# Patient Record
Sex: MALE | Race: OTHER | HISPANIC OR LATINO | URBAN - METROPOLITAN AREA
[De-identification: names, ages, dates, MRNs, and addresses within clinical notes are randomized per-mention and may not be internally consistent; named-entity substitution may affect disease eponyms.]

---

## 2018-12-06 ENCOUNTER — EMERGENCY (EMERGENCY)
Facility: HOSPITAL | Age: 23
LOS: 0 days | Discharge: HOME | End: 2018-12-06
Attending: EMERGENCY MEDICINE
Payer: MEDICAID

## 2018-12-06 VITALS
HEART RATE: 49 BPM | RESPIRATION RATE: 18 BRPM | DIASTOLIC BLOOD PRESSURE: 44 MMHG | TEMPERATURE: 98 F | OXYGEN SATURATION: 100 % | SYSTOLIC BLOOD PRESSURE: 125 MMHG

## 2018-12-06 VITALS
HEART RATE: 50 BPM | DIASTOLIC BLOOD PRESSURE: 55 MMHG | SYSTOLIC BLOOD PRESSURE: 104 MMHG | TEMPERATURE: 99 F | RESPIRATION RATE: 18 BRPM | OXYGEN SATURATION: 100 %

## 2018-12-06 DIAGNOSIS — F12.10 CANNABIS ABUSE, UNCOMPLICATED: ICD-10-CM

## 2018-12-06 DIAGNOSIS — R07.89 OTHER CHEST PAIN: ICD-10-CM

## 2018-12-06 LAB
ALBUMIN SERPL ELPH-MCNC: 4 G/DL — SIGNIFICANT CHANGE UP (ref 3.3–5)
ALP SERPL-CCNC: 42 U/L — SIGNIFICANT CHANGE UP (ref 40–120)
ALT FLD-CCNC: 28 U/L — SIGNIFICANT CHANGE UP (ref 12–78)
ANION GAP SERPL CALC-SCNC: 8 MMOL/L — SIGNIFICANT CHANGE UP (ref 5–17)
APTT BLD: 28.4 SEC — SIGNIFICANT CHANGE UP (ref 27.5–36.3)
AST SERPL-CCNC: 20 U/L — SIGNIFICANT CHANGE UP (ref 15–37)
BASOPHILS # BLD AUTO: 0.06 K/UL — SIGNIFICANT CHANGE UP (ref 0–0.2)
BASOPHILS NFR BLD AUTO: 0.8 % — SIGNIFICANT CHANGE UP (ref 0–2)
BILIRUB SERPL-MCNC: 0.5 MG/DL — SIGNIFICANT CHANGE UP (ref 0.2–1.2)
BUN SERPL-MCNC: 11 MG/DL — SIGNIFICANT CHANGE UP (ref 7–23)
CALCIUM SERPL-MCNC: 8.5 MG/DL — SIGNIFICANT CHANGE UP (ref 8.5–10.1)
CHLORIDE SERPL-SCNC: 106 MMOL/L — SIGNIFICANT CHANGE UP (ref 96–108)
CO2 SERPL-SCNC: 25 MMOL/L — SIGNIFICANT CHANGE UP (ref 22–31)
CREAT SERPL-MCNC: 0.83 MG/DL — SIGNIFICANT CHANGE UP (ref 0.5–1.3)
D DIMER BLD IA.RAPID-MCNC: <150 NG/ML DDU — SIGNIFICANT CHANGE UP
EOSINOPHIL # BLD AUTO: 0.1 K/UL — SIGNIFICANT CHANGE UP (ref 0–0.5)
EOSINOPHIL NFR BLD AUTO: 1.4 % — SIGNIFICANT CHANGE UP (ref 0–6)
GLUCOSE SERPL-MCNC: 95 MG/DL — SIGNIFICANT CHANGE UP (ref 70–99)
HCT VFR BLD CALC: 41.4 % — SIGNIFICANT CHANGE UP (ref 39–50)
HGB BLD-MCNC: 13.9 G/DL — SIGNIFICANT CHANGE UP (ref 13–17)
IMM GRANULOCYTES NFR BLD AUTO: 0.1 % — SIGNIFICANT CHANGE UP (ref 0–1.5)
INR BLD: 1.06 RATIO — SIGNIFICANT CHANGE UP (ref 0.88–1.16)
LYMPHOCYTES # BLD AUTO: 1.45 K/UL — SIGNIFICANT CHANGE UP (ref 1–3.3)
LYMPHOCYTES # BLD AUTO: 19.9 % — SIGNIFICANT CHANGE UP (ref 13–44)
MCHC RBC-ENTMCNC: 28.8 PG — SIGNIFICANT CHANGE UP (ref 27–34)
MCHC RBC-ENTMCNC: 33.6 GM/DL — SIGNIFICANT CHANGE UP (ref 32–36)
MCV RBC AUTO: 85.9 FL — SIGNIFICANT CHANGE UP (ref 80–100)
MONOCYTES # BLD AUTO: 0.34 K/UL — SIGNIFICANT CHANGE UP (ref 0–0.9)
MONOCYTES NFR BLD AUTO: 4.7 % — SIGNIFICANT CHANGE UP (ref 2–14)
NEUTROPHILS # BLD AUTO: 5.31 K/UL — SIGNIFICANT CHANGE UP (ref 1.8–7.4)
NEUTROPHILS NFR BLD AUTO: 73.1 % — SIGNIFICANT CHANGE UP (ref 43–77)
PLATELET # BLD AUTO: 223 K/UL — SIGNIFICANT CHANGE UP (ref 150–400)
POTASSIUM SERPL-MCNC: 3.9 MMOL/L — SIGNIFICANT CHANGE UP (ref 3.5–5.3)
POTASSIUM SERPL-SCNC: 3.9 MMOL/L — SIGNIFICANT CHANGE UP (ref 3.5–5.3)
PROT SERPL-MCNC: 7.3 GM/DL — SIGNIFICANT CHANGE UP (ref 6–8.3)
PROTHROM AB SERPL-ACNC: 11.8 SEC — SIGNIFICANT CHANGE UP (ref 10–12.9)
RBC # BLD: 4.82 M/UL — SIGNIFICANT CHANGE UP (ref 4.2–5.8)
RBC # FLD: 12.4 % — SIGNIFICANT CHANGE UP (ref 10.3–14.5)
SODIUM SERPL-SCNC: 139 MMOL/L — SIGNIFICANT CHANGE UP (ref 135–145)
TROPONIN I SERPL-MCNC: <0.015 NG/ML — SIGNIFICANT CHANGE UP (ref 0.01–0.04)
TROPONIN I SERPL-MCNC: <0.015 NG/ML — SIGNIFICANT CHANGE UP (ref 0.01–0.04)
WBC # BLD: 7.27 K/UL — SIGNIFICANT CHANGE UP (ref 3.8–10.5)
WBC # FLD AUTO: 7.27 K/UL — SIGNIFICANT CHANGE UP (ref 3.8–10.5)

## 2018-12-06 PROCEDURE — 99284 EMERGENCY DEPT VISIT MOD MDM: CPT

## 2018-12-06 PROCEDURE — 71045 X-RAY EXAM CHEST 1 VIEW: CPT | Mod: 26

## 2018-12-06 PROCEDURE — 93010 ELECTROCARDIOGRAM REPORT: CPT

## 2018-12-06 RX ORDER — IBUPROFEN 200 MG
400 TABLET ORAL ONCE
Qty: 0 | Refills: 0 | Status: COMPLETED | OUTPATIENT
Start: 2018-12-06 | End: 2018-12-06

## 2018-12-06 RX ADMIN — Medication 400 MILLIGRAM(S): at 19:16

## 2018-12-06 NOTE — ED STATDOCS - PROGRESS NOTE DETAILS
KAYLIN Car:   Patient has been seen, evaluated and orders have been written by the attending in intake. Patient is stable.  I will follow up the results of orders written and I will continue to evaluate/observe the patient.  CP x 2 days.  Pt. with family history of HTN.  Susana Car PA-C Reevaluated patient at bedside.  Patient feeling much improved.  Discussed the results of all diagnostic testing in ED and copies of all reports given.   An opportunity to ask questions was given.  Discussed the importance of prompt, close medical follow-up.  Patient will return with any changes, concerns or persistent / worsening symptoms.  Understanding of all instructions verbalized.  Susana Car PA-C  #[id number] used for .  To FU with [facility].  Strict return precautions provided.

## 2018-12-06 NOTE — ED STATDOCS - ATTENDING CONTRIBUTION TO CARE
I, Radha Lee MD, personally saw the patient with ACP.  I have personally performed a face to face diagnostic evaluation on this patient.  I have reviewed the ACP note and agree with the history, exam, and plan of care, except as noted.

## 2018-12-06 NOTE — ED STATDOCS - OBJECTIVE STATEMENT
22 y/o male with no relevant PMHx presents to the ED c/o left-sided, intermittent CP and SOB x2 days, worsened with breathing deeply, described as pressure, non-radiating. Pt is unsure what brings on the pain, it comes on at random. Pain is not worsened with palpation or left arm movement. Denies recent travel. +marijuana use last night. Pt notes he is a . Denies recent exercise or weight lifting other than his physically-demanding job. +FHx of cardiac issue in pt's father and sister, pt is unsure what issue is. Denies sudden cardiac death in family. Denies cocaine use.  #[id number]

## 2018-12-06 NOTE — ED ADULT NURSE NOTE - CHIEF COMPLAINT QUOTE
Pt presents to ED from [facility] c/o CP starting yesterday. Pt reports SOB. Pt denies recent travel. Pt taken directly to intake room for EKG and VS

## 2018-12-06 NOTE — ED ADULT TRIAGE NOTE - CHIEF COMPLAINT QUOTE
Pt presents to ED from [facility] c/o CP starting yesterday. Pt reports SOB. Pt denies recent travel. Pt taken directly to intake room for EKG and VS

## 2018-12-06 NOTE — ED STATDOCS - MEDICAL DECISION MAKING DETAILS
24 y/o male with chest pressure x2 days, intermittent. Will obtain d-dimer, troponin, cardiac monitoring, CXR, and reassess.

## 2020-01-09 ENCOUNTER — EMERGENCY (EMERGENCY)
Facility: HOSPITAL | Age: 25
LOS: 0 days | Discharge: HOME | End: 2020-01-09
Attending: EMERGENCY MEDICINE
Payer: MEDICAID

## 2020-01-09 VITALS
SYSTOLIC BLOOD PRESSURE: 118 MMHG | DIASTOLIC BLOOD PRESSURE: 58 MMHG | OXYGEN SATURATION: 100 % | HEART RATE: 55 BPM | RESPIRATION RATE: 19 BRPM | TEMPERATURE: 98 F

## 2020-01-09 VITALS — WEIGHT: 199.96 LBS | HEIGHT: 66.93 IN

## 2020-01-09 DIAGNOSIS — R10.32 LEFT LOWER QUADRANT PAIN: ICD-10-CM

## 2020-01-09 DIAGNOSIS — R19.7 DIARRHEA, UNSPECIFIED: ICD-10-CM

## 2020-01-09 DIAGNOSIS — K57.92 DIVERTICULITIS OF INTESTINE, PART UNSPECIFIED, WITHOUT PERFORATION OR ABSCESS WITHOUT BLEEDING: ICD-10-CM

## 2020-01-09 DIAGNOSIS — M54.5 LOW BACK PAIN: ICD-10-CM

## 2020-01-09 LAB
ALBUMIN SERPL ELPH-MCNC: 4 G/DL — SIGNIFICANT CHANGE UP (ref 3.3–5)
ALP SERPL-CCNC: 45 U/L — SIGNIFICANT CHANGE UP (ref 40–120)
ALT FLD-CCNC: 24 U/L — SIGNIFICANT CHANGE UP (ref 12–78)
ANION GAP SERPL CALC-SCNC: 4 MMOL/L — LOW (ref 5–17)
AST SERPL-CCNC: 13 U/L — LOW (ref 15–37)
BASOPHILS # BLD AUTO: 0.05 K/UL — SIGNIFICANT CHANGE UP (ref 0–0.2)
BASOPHILS NFR BLD AUTO: 0.8 % — SIGNIFICANT CHANGE UP (ref 0–2)
BILIRUB SERPL-MCNC: 0.4 MG/DL — SIGNIFICANT CHANGE UP (ref 0.2–1.2)
BUN SERPL-MCNC: 12 MG/DL — SIGNIFICANT CHANGE UP (ref 7–23)
CALCIUM SERPL-MCNC: 8.7 MG/DL — SIGNIFICANT CHANGE UP (ref 8.5–10.1)
CHLORIDE SERPL-SCNC: 106 MMOL/L — SIGNIFICANT CHANGE UP (ref 96–108)
CO2 SERPL-SCNC: 28 MMOL/L — SIGNIFICANT CHANGE UP (ref 22–31)
CREAT SERPL-MCNC: 0.96 MG/DL — SIGNIFICANT CHANGE UP (ref 0.5–1.3)
EOSINOPHIL # BLD AUTO: 0.19 K/UL — SIGNIFICANT CHANGE UP (ref 0–0.5)
EOSINOPHIL NFR BLD AUTO: 3.1 % — SIGNIFICANT CHANGE UP (ref 0–6)
GLUCOSE SERPL-MCNC: 103 MG/DL — HIGH (ref 70–99)
HCT VFR BLD CALC: 43.7 % — SIGNIFICANT CHANGE UP (ref 39–50)
HGB BLD-MCNC: 14.4 G/DL — SIGNIFICANT CHANGE UP (ref 13–17)
IMM GRANULOCYTES NFR BLD AUTO: 0.3 % — SIGNIFICANT CHANGE UP (ref 0–1.5)
LIDOCAIN IGE QN: 100 U/L — SIGNIFICANT CHANGE UP (ref 73–393)
LYMPHOCYTES # BLD AUTO: 1.7 K/UL — SIGNIFICANT CHANGE UP (ref 1–3.3)
LYMPHOCYTES # BLD AUTO: 27.3 % — SIGNIFICANT CHANGE UP (ref 13–44)
MCHC RBC-ENTMCNC: 27.8 PG — SIGNIFICANT CHANGE UP (ref 27–34)
MCHC RBC-ENTMCNC: 33 GM/DL — SIGNIFICANT CHANGE UP (ref 32–36)
MCV RBC AUTO: 84.4 FL — SIGNIFICANT CHANGE UP (ref 80–100)
MONOCYTES # BLD AUTO: 0.38 K/UL — SIGNIFICANT CHANGE UP (ref 0–0.9)
MONOCYTES NFR BLD AUTO: 6.1 % — SIGNIFICANT CHANGE UP (ref 2–14)
NEUTROPHILS # BLD AUTO: 3.88 K/UL — SIGNIFICANT CHANGE UP (ref 1.8–7.4)
NEUTROPHILS NFR BLD AUTO: 62.4 % — SIGNIFICANT CHANGE UP (ref 43–77)
PLATELET # BLD AUTO: 214 K/UL — SIGNIFICANT CHANGE UP (ref 150–400)
POTASSIUM SERPL-MCNC: 3.7 MMOL/L — SIGNIFICANT CHANGE UP (ref 3.5–5.3)
POTASSIUM SERPL-SCNC: 3.7 MMOL/L — SIGNIFICANT CHANGE UP (ref 3.5–5.3)
PROT SERPL-MCNC: 7.4 GM/DL — SIGNIFICANT CHANGE UP (ref 6–8.3)
RBC # BLD: 5.18 M/UL — SIGNIFICANT CHANGE UP (ref 4.2–5.8)
RBC # FLD: 12.2 % — SIGNIFICANT CHANGE UP (ref 10.3–14.5)
SODIUM SERPL-SCNC: 138 MMOL/L — SIGNIFICANT CHANGE UP (ref 135–145)
WBC # BLD: 6.22 K/UL — SIGNIFICANT CHANGE UP (ref 3.8–10.5)
WBC # FLD AUTO: 6.22 K/UL — SIGNIFICANT CHANGE UP (ref 3.8–10.5)

## 2020-01-09 PROCEDURE — 99284 EMERGENCY DEPT VISIT MOD MDM: CPT | Mod: 25

## 2020-01-09 PROCEDURE — 99284 EMERGENCY DEPT VISIT MOD MDM: CPT

## 2020-01-09 PROCEDURE — 36415 COLL VENOUS BLD VENIPUNCTURE: CPT

## 2020-01-09 PROCEDURE — 85025 COMPLETE CBC W/AUTO DIFF WBC: CPT

## 2020-01-09 PROCEDURE — 96374 THER/PROPH/DIAG INJ IV PUSH: CPT | Mod: XU

## 2020-01-09 PROCEDURE — 74177 CT ABD & PELVIS W/CONTRAST: CPT | Mod: 26

## 2020-01-09 PROCEDURE — 83690 ASSAY OF LIPASE: CPT

## 2020-01-09 PROCEDURE — 74177 CT ABD & PELVIS W/CONTRAST: CPT

## 2020-01-09 PROCEDURE — 80053 COMPREHEN METABOLIC PANEL: CPT

## 2020-01-09 RX ORDER — PANTOPRAZOLE SODIUM 20 MG/1
1 TABLET, DELAYED RELEASE ORAL
Qty: 14 | Refills: 0
Start: 2020-01-09

## 2020-01-09 RX ORDER — KETOROLAC TROMETHAMINE 30 MG/ML
30 SYRINGE (ML) INJECTION ONCE
Refills: 0 | Status: DISCONTINUED | OUTPATIENT
Start: 2020-01-09 | End: 2020-01-09

## 2020-01-09 RX ORDER — CIPROFLOXACIN LACTATE 400MG/40ML
500 VIAL (ML) INTRAVENOUS ONCE
Refills: 0 | Status: COMPLETED | OUTPATIENT
Start: 2020-01-09 | End: 2020-01-09

## 2020-01-09 RX ORDER — METRONIDAZOLE 500 MG
1 TABLET ORAL
Qty: 30 | Refills: 0
Start: 2020-01-09

## 2020-01-09 RX ORDER — SODIUM CHLORIDE 9 MG/ML
1000 INJECTION INTRAMUSCULAR; INTRAVENOUS; SUBCUTANEOUS ONCE
Refills: 0 | Status: DISCONTINUED | OUTPATIENT
Start: 2020-01-09 | End: 2020-01-09

## 2020-01-09 RX ORDER — MOXIFLOXACIN HYDROCHLORIDE TABLETS, 400 MG 400 MG/1
1 TABLET, FILM COATED ORAL
Qty: 20 | Refills: 0
Start: 2020-01-09

## 2020-01-09 RX ORDER — METRONIDAZOLE 500 MG
500 TABLET ORAL ONCE
Refills: 0 | Status: COMPLETED | OUTPATIENT
Start: 2020-01-09 | End: 2020-01-09

## 2020-01-09 RX ADMIN — Medication 30 MILLIGRAM(S): at 20:40

## 2020-01-09 RX ADMIN — Medication 500 MILLIGRAM(S): at 20:40

## 2020-01-09 NOTE — ED STATDOCS - PROGRESS NOTE DETAILS
Patient is a 23 y/o male with no significant PMHx who presents to [facility] c/o LLQ abd pain x 3 days. +Diarrhea DENIES nausea/vomiting/rectal bleeding, fever of chills. ~KAYLIN Gallardo Patient re-examined and re-evaluated. Patient feels much better at this time. ED evaluation, Diagnosis and management discussed with the patient and family in detail. Workup results discussed with ED attending TYRONE Curtis to dc home.  Close PMD follow up encouraged.  Strict ED return instructions discussed in detail and patient given the opportunity to ask any questions about their discharge diagnosis and instructions. Patient verbalized understanding. ~ KAYLIN Gallardo Patient re-examined and re-evaluated. Patient feels much better at this time. ED evaluation, Diagnosis and management discussed with the patient and family in detail. Workup results discussed with ED attending TYRONE Curtis to dc home on cipro, flagyl. .  Close PMD follow up encouraged.  Strict ED return instructions discussed in detail and patient given the opportunity to ask any questions about their discharge diagnosis and instructions. Patient verbalized understanding. ~ KAYLIN Gallardo

## 2020-01-09 NOTE — ED STATDOCS - CARE PROVIDERS DIRECT ADDRESSES
[email or web address]

## 2020-01-09 NOTE — ED ADULT NURSE NOTE - CHPI ED NUR SYMPTOMS NEG
no vomiting/no abdominal distension/no chills/no fever/no dysuria/no nausea/no hematuria/no blood in stool/no burning urination

## 2020-01-09 NOTE — ED STATDOCS - PHYSICAL EXAMINATION
PA NOTE: GEN: AOX3, NAD. HEENT: Throat clear. Airway is patent. EYES: PERRLA. EOMI. Head: NC/AT. NECK: Supple, No JVD. FROM. C-spine non-tender. CV:S1S2, RRR, LUNGS: CTA b/l, no w/r/r. CHEST: Equal chest expansion and rise. No deformity. ABD: Soft, +Mild tenderness LLQ. no rebound, no guarding. No CVAT. EXT: No e/c/c. 2+ distal pulses. SKIN: No rashes. NEURO: No focal deficits. CN II-XII intact. FROM. 5/5 motor and sensory. KAYLIN Gallardo

## 2020-01-09 NOTE — ED STATDOCS - CLINICAL SUMMARY MEDICAL DECISION MAKING FREE TEXT BOX
patient seen and evaluated for LLQ pain. TTP LLQ. CT +luc for Diverticulitis. No WBC. Discussed with Dr. Jules. ok to dc on Cipro + Flagyl +Protonix. ~KAYLIN Gallardo

## 2020-01-09 NOTE — ED ADULT TRIAGE NOTE - CHIEF COMPLAINT QUOTE
Pt reports several days of abd pain worsening when he lies down or eats. Pt reports it feels as if he has a ball in his stomach.

## 2020-01-09 NOTE — ED STATDOCS - OBJECTIVE STATEMENT
23 y/o male with no pertinent PMHx presents to ED left sided abdominal pain worsening while lying down and eating x3-4 days. Pt states he feel a mass similar to a hernia that radiates around the left side of his abdomen since 2 years. +diarrhea. Denies fevers, n/v. Denies dysuria, hematuria. +Lower back pain. Hasn't taken any pain medications. NKDA. [place]  used: [id number]. No PMD.

## 2020-01-09 NOTE — ED STATDOCS - PATIENT PORTAL LINK FT
You can access the FollowMyHealth Patient Portal offered by [facility] by registering at the following website: [email or web address]. By joining [employer]’s FollowMyHealth portal, you will also be able to view your health information using other applications (apps) compatible with our system.

## 2020-01-09 NOTE — ED STATDOCS - CARE PROVIDER_API CALL
Jack [email or web address])  Gastroenterology; Internal Medicine  [address] [place] [address]  Phone: [phone]  Fax: [phone]  Follow Up Time: Urgent

## 2020-01-09 NOTE — ED ADULT NURSE NOTE - OBJECTIVE STATEMENT
Patient presents complaining of abdominal pain that worsens with eating and lying down. pain started 4 days ago. Patient reports that he feels a mass in his abdomen that he describes as similar to a hernia. also reports diarrhea. no nausea or vomiting.

## 2020-05-10 NOTE — ED STATDOCS - MUSCULOSKELETAL [+], MLM
Alert-The patient is alert, awake and responds to voice. The patient is oriented to time, place, and person. The triage nurse is able to obtain subjective information. BACK PAIN/+lower back pain

## 2023-07-17 ENCOUNTER — EMERGENCY (EMERGENCY)
Facility: HOSPITAL | Age: 28
LOS: 0 days | Discharge: HOME | End: 2023-07-18
Attending: EMERGENCY MEDICINE
Payer: MEDICAID

## 2023-07-17 VITALS — WEIGHT: 166.89 LBS

## 2023-07-17 DIAGNOSIS — D18.1 LYMPHANGIOMA, ANY SITE: ICD-10-CM

## 2023-07-17 DIAGNOSIS — R42 DIZZINESS AND GIDDINESS: ICD-10-CM

## 2023-07-17 DIAGNOSIS — R51.9 HEADACHE, UNSPECIFIED: ICD-10-CM

## 2023-07-17 DIAGNOSIS — R11.2 NAUSEA WITH VOMITING, UNSPECIFIED: ICD-10-CM

## 2023-07-17 LAB
ALBUMIN SERPL ELPH-MCNC: 3.4 G/DL — SIGNIFICANT CHANGE UP (ref 3.3–5)
ALP SERPL-CCNC: 50 U/L — SIGNIFICANT CHANGE UP (ref 40–120)
ALT FLD-CCNC: 47 U/L — SIGNIFICANT CHANGE UP (ref 12–78)
ANION GAP SERPL CALC-SCNC: 4 MMOL/L — LOW (ref 5–17)
AST SERPL-CCNC: 16 U/L — SIGNIFICANT CHANGE UP (ref 15–37)
BASOPHILS # BLD AUTO: 0.03 K/UL — SIGNIFICANT CHANGE UP (ref 0–0.2)
BASOPHILS NFR BLD AUTO: 0.4 % — SIGNIFICANT CHANGE UP (ref 0–2)
BILIRUB SERPL-MCNC: 0.4 MG/DL — SIGNIFICANT CHANGE UP (ref 0.2–1.2)
BUN SERPL-MCNC: 13 MG/DL — SIGNIFICANT CHANGE UP (ref 7–23)
CALCIUM SERPL-MCNC: 8.4 MG/DL — LOW (ref 8.5–10.1)
CHLORIDE SERPL-SCNC: 108 MMOL/L — SIGNIFICANT CHANGE UP (ref 96–108)
CO2 SERPL-SCNC: 27 MMOL/L — SIGNIFICANT CHANGE UP (ref 22–31)
CREAT SERPL-MCNC: 0.99 MG/DL — SIGNIFICANT CHANGE UP (ref 0.5–1.3)
EGFR: 106 ML/MIN/1.73M2 — SIGNIFICANT CHANGE UP
EOSINOPHIL # BLD AUTO: 0.09 K/UL — SIGNIFICANT CHANGE UP (ref 0–0.5)
EOSINOPHIL NFR BLD AUTO: 1.3 % — SIGNIFICANT CHANGE UP (ref 0–6)
GLUCOSE SERPL-MCNC: 90 MG/DL — SIGNIFICANT CHANGE UP (ref 70–99)
HCT VFR BLD CALC: 42.2 % — SIGNIFICANT CHANGE UP (ref 39–50)
HGB BLD-MCNC: 14.3 G/DL — SIGNIFICANT CHANGE UP (ref 13–17)
IMM GRANULOCYTES NFR BLD AUTO: 0.4 % — SIGNIFICANT CHANGE UP (ref 0–0.9)
LYMPHOCYTES # BLD AUTO: 2.17 K/UL — SIGNIFICANT CHANGE UP (ref 1–3.3)
LYMPHOCYTES # BLD AUTO: 32.1 % — SIGNIFICANT CHANGE UP (ref 13–44)
MCHC RBC-ENTMCNC: 29.3 PG — SIGNIFICANT CHANGE UP (ref 27–34)
MCHC RBC-ENTMCNC: 33.9 GM/DL — SIGNIFICANT CHANGE UP (ref 32–36)
MCV RBC AUTO: 86.5 FL — SIGNIFICANT CHANGE UP (ref 80–100)
MONOCYTES # BLD AUTO: 0.42 K/UL — SIGNIFICANT CHANGE UP (ref 0–0.9)
MONOCYTES NFR BLD AUTO: 6.2 % — SIGNIFICANT CHANGE UP (ref 2–14)
NEUTROPHILS # BLD AUTO: 4.03 K/UL — SIGNIFICANT CHANGE UP (ref 1.8–7.4)
NEUTROPHILS NFR BLD AUTO: 59.6 % — SIGNIFICANT CHANGE UP (ref 43–77)
PLATELET # BLD AUTO: 262 K/UL — SIGNIFICANT CHANGE UP (ref 150–400)
POTASSIUM SERPL-MCNC: 3.6 MMOL/L — SIGNIFICANT CHANGE UP (ref 3.5–5.3)
POTASSIUM SERPL-SCNC: 3.6 MMOL/L — SIGNIFICANT CHANGE UP (ref 3.5–5.3)
PROT SERPL-MCNC: 7 GM/DL — SIGNIFICANT CHANGE UP (ref 6–8.3)
RBC # BLD: 4.88 M/UL — SIGNIFICANT CHANGE UP (ref 4.2–5.8)
RBC # FLD: 12.6 % — SIGNIFICANT CHANGE UP (ref 10.3–14.5)
SODIUM SERPL-SCNC: 139 MMOL/L — SIGNIFICANT CHANGE UP (ref 135–145)
WBC # BLD: 6.77 K/UL — SIGNIFICANT CHANGE UP (ref 3.8–10.5)
WBC # FLD AUTO: 6.77 K/UL — SIGNIFICANT CHANGE UP (ref 3.8–10.5)

## 2023-07-17 PROCEDURE — 99285 EMERGENCY DEPT VISIT HI MDM: CPT

## 2023-07-17 PROCEDURE — 99284 EMERGENCY DEPT VISIT MOD MDM: CPT | Mod: 25

## 2023-07-17 PROCEDURE — 85025 COMPLETE CBC W/AUTO DIFF WBC: CPT

## 2023-07-17 PROCEDURE — 70450 CT HEAD/BRAIN W/O DYE: CPT | Mod: MA

## 2023-07-17 PROCEDURE — 36415 COLL VENOUS BLD VENIPUNCTURE: CPT

## 2023-07-17 PROCEDURE — 96374 THER/PROPH/DIAG INJ IV PUSH: CPT

## 2023-07-17 PROCEDURE — 96375 TX/PRO/DX INJ NEW DRUG ADDON: CPT

## 2023-07-17 PROCEDURE — 80053 COMPREHEN METABOLIC PANEL: CPT

## 2023-07-17 PROCEDURE — 70450 CT HEAD/BRAIN W/O DYE: CPT | Mod: 26,MA

## 2023-07-17 RX ORDER — ACETAMINOPHEN 500 MG
1000 TABLET ORAL ONCE
Refills: 0 | Status: COMPLETED | OUTPATIENT
Start: 2023-07-17 | End: 2023-07-17

## 2023-07-17 RX ORDER — SODIUM CHLORIDE 9 MG/ML
1000 INJECTION INTRAMUSCULAR; INTRAVENOUS; SUBCUTANEOUS ONCE
Refills: 0 | Status: COMPLETED | OUTPATIENT
Start: 2023-07-17 | End: 2023-07-17

## 2023-07-17 RX ORDER — ONDANSETRON 8 MG/1
4 TABLET, FILM COATED ORAL ONCE
Refills: 0 | Status: COMPLETED | OUTPATIENT
Start: 2023-07-17 | End: 2023-07-17

## 2023-07-17 RX ADMIN — SODIUM CHLORIDE 2000 MILLILITER(S): 9 INJECTION INTRAMUSCULAR; INTRAVENOUS; SUBCUTANEOUS at 23:10

## 2023-07-17 RX ADMIN — Medication 400 MILLIGRAM(S): at 23:10

## 2023-07-17 NOTE — ED STATDOCS - OBJECTIVE STATEMENT
28 year old male presents to the ED for headache x3 day. Taking Tylenol with no relief. Pt also has nausea, vomiting and dizziness. Denies fall or trauma.

## 2023-07-17 NOTE — ED STATDOCS - CARE PROVIDER_API CALL
Florentino Green  Neurosurgery  [address] [place] [id number]  Phone: [phone]  Fax: [phone]  Follow Up Time:

## 2023-07-17 NOTE — ED STATDOCS - NSFOLLOWUPINSTRUCTIONS_ED_ALL_ED_FT
Dolor de dionne general     necesita sia walter con sia especialista de neurologia    El dolor de dionne es un dolor o malestar que se siente en la soheila de la dionne o del ada. Hay muchas causas y tipos de jovanny de dionne. Entre los tipos más frecuentes, se incluyen los siguientes:  Cefaleas tensionales.  Cefaleas migrañosas.  Cefalea en brotes.  Cefaleas diarias crónicas.  En ocasiones, el dolor de dionne puede no tener sia causa específica.    Siga estas indicaciones en matson casa:  Controle matson afección para detectar cualquier cambio. Informe a matson médico acerca de cualquier cambio. Siga estos pasos para controlar matson afección:    Control del dolor    A bag of ice on a towel on the skin.   A heating pad for use on the painful area.   Use los medicamentos de venta antony y los recetados solamente brian se lo haya indicado el médico. El tratamiento puede incluir medicamentos para el dolor que se marko por boca o que se aplican sobre la piel.  Cuando sienta dolor de dionne acuéstese en un cuarto oscuro y tranquilo.  Mantenga las luces tenues si las luces brillantes le molestan o bigg jovanny de dionne empeoran.  Si se lo indican, aplíquese hielo en la doinne y en la soheila del ada:  Ponga el hielo en sia bolsa plástica.  Coloque sia toalla entre la piel y la bolsa.  Aplique el hielo jon 20 minutos, 2 a 3 veces al día.  Retire el hielo si la piel se pone de color mello brillante. Melody [place] es muy importante. Si no puede sentir dolor, calor o frío, tiene un mayor riesgo de que se dañe la soheila.  Si se lo indican, aplique calor en la soheila afectada. Use la rachel de calor que el médico le recomiende, brian sia compresa de calor húmedo o sia almohadilla térmica.  Coloque sia toalla entre la piel y la rachel de calor.  Aplique calor jon 20 a 30 minutos.  Retire la rachel de calor si la piel se pone de color mello brillante. Mel[place] es especialmente importante si no puede sentir dolor, calor o frío. Corre un mayor riesgo de sufrir quemaduras.  Comida y bebida    Mantenga un horario para las comidas.  Si katarina alcohol:  Limite la cantidad que katarina a lo siguiente:  De 0 a 1 medida por día para las mujeres que no están embarazadas.  De 0 a 2 medidas por día para los hombres.  Sepa cuánta cantidad de alcohol hay en las bebidas. En los [place], sia medida equivale a sia botella de cerveza de 12 oz (355 ml), un vaso de vino de 5 oz (148 ml) o un vaso de sia bebida alcohólica de ehsan graduación de 1½ oz (44 ml).  Deje de elodia cafeína o disminuya la cantidad que consume.  Arlene suficiente líquido brian para mantener la orina de color amarillo pálido.  Indicaciones generales    A person writing in a journal.   Lleve un diario de los jovanny de dionne para averiguar qué factores pueden desencadenarlos. Registre, por ejemplo, lo siguiente:  Lo que usted come y katarina.  El tiempo que duerme.  Algún cambio en matson dieta o en los medicamentos.  Pruebe algunas técnicas de relajación, brian los masajes.  Limite el estrés.  Siéntese con la espalda recta y no tense los músculos.  No consuma ningún producto que contenga nicotina o tabaco. Estos productos incluyen cigarrillos, tabaco para mascar y aparatos de vapeo, brian los cigarrillos electrónicos. Si necesita ayuda para dejar de consumir estos productos, consulte al médico.  Misha actividad física habitualmente brian se lo haya indicado el médico.  Tenga un horario fijo para dormir. Duerma entre 7 y 9 horas todas las noches o la cantidad de horas que le haya recomendado el médico.  Concurra a todas las visitas de seguimiento. [place] es importante.  Comuníquese con un médico si:  Los medicamentos no le alivian los síntomas.  Tiene un dolor de dinone que es diferente de la cefalea habitual.  Tiene náuseas o vómitos.  Tiene fiebre.  Solicite ayuda de inmediato si:  El dolor de dionne:  Se vuelve intenso rápidamente.  Empeora después de hacer actividad física moderada o intensa.  Tiene alguno de estos síntomas:  Vómitos repetidos.  Dolor o rigidez en el ada.  Cambios en la visión.  Dolor en un angus o en un oído.  Problemas para hablar.  Debilidad muscular o pérdida del control muscular.  Pérdida del equilibrio o de la coordinación.  Sufre mareos o se desmaya.  Experimenta confusión.  Tiene sia convulsión.  Estos síntomas pueden representar un problema grave que constituye sia emergencia. No espere a mary si los síntomas desaparecen. Solicite atención médica de inmediato. Comuníquese con el servicio de emergencias de matson localidad (911 en los [place]). No conduzca por bigg propios medios hasta el hospital.    Resumen  El dolor de dionne es un dolor o malestar que se siente en la soheila de la dionne o del ada.  Hay muchas causas y tipos de jovanny de dionne. En algunos casos, es posible que no se encuentre la causa.  Lleve un diario de los jovanny de dionne para averiguar qué factores pueden desencadenarlos. Controle matson afección para detectar cualquier cambio. Informe a matson médico acerca de cualquier cambio.  Comuníquese con un médico si tiene un dolor de dionne que es diferente de lo habitual o si los síntomas no se alivian con los medicamentos.  Solicite ayuda de inmediato si el dolor se vuelve intenso, vomita, tiene pérdida de la visión, pierde el equilibrio o tiene sia convulsión.  Esta información no tiene brian fin reemplazar el consejo del médico. Asegúrese de hacerle al médico cualquier pregunta que tenga.    Document Revised: 06/07/2022 Document Reviewed: 06/07/2022

## 2023-07-17 NOTE — ED STATDOCS - PATIENT PORTAL LINK FT
You can access the FollowMyHealth Patient Portal offered by [facility] by registering at the following website: [email or web address]. By joining [employer]’s FollowMyHealth portal, you will also be able to view your health information using other applications (apps) compatible with our system.

## 2023-07-17 NOTE — ED STATDOCS - PROGRESS NOTE DETAILS
spoke to radiologist Dr. 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 Pt awake, alert and pain better but not gone JANIE Swenson DO

## 2023-07-18 ENCOUNTER — EMERGENCY (EMERGENCY)
Facility: HOSPITAL | Age: 28
LOS: 1 days | Discharge: HOME | End: 2023-07-18
Attending: EMERGENCY MEDICINE | Admitting: EMERGENCY MEDICINE
Payer: SELF-PAY

## 2023-07-18 VITALS
HEART RATE: 52 BPM | RESPIRATION RATE: 16 BRPM | WEIGHT: 164.91 LBS | TEMPERATURE: 98 F | SYSTOLIC BLOOD PRESSURE: 150 MMHG | DIASTOLIC BLOOD PRESSURE: 80 MMHG | HEIGHT: 71.65 IN | OXYGEN SATURATION: 100 %

## 2023-07-18 VITALS
TEMPERATURE: 98 F | DIASTOLIC BLOOD PRESSURE: 78 MMHG | RESPIRATION RATE: 17 BRPM | HEART RATE: 49 BPM | OXYGEN SATURATION: 100 % | SYSTOLIC BLOOD PRESSURE: 136 MMHG

## 2023-07-18 VITALS
RESPIRATION RATE: 18 BRPM | TEMPERATURE: 98 F | SYSTOLIC BLOOD PRESSURE: 114 MMHG | OXYGEN SATURATION: 99 % | DIASTOLIC BLOOD PRESSURE: 65 MMHG | HEART RATE: 58 BPM

## 2023-07-18 PROCEDURE — 70450 CT HEAD/BRAIN W/O DYE: CPT | Mod: 26,MA

## 2023-07-18 PROCEDURE — 99284 EMERGENCY DEPT VISIT MOD MDM: CPT

## 2023-07-18 PROCEDURE — 99284 EMERGENCY DEPT VISIT MOD MDM: CPT | Mod: 25

## 2023-07-18 PROCEDURE — 70450 CT HEAD/BRAIN W/O DYE: CPT | Mod: MA

## 2023-07-18 RX ORDER — MORPHINE SULFATE 50 MG/1
4 CAPSULE, EXTENDED RELEASE ORAL ONCE
Refills: 0 | Status: DISCONTINUED | OUTPATIENT
Start: 2023-07-18 | End: 2023-07-18

## 2023-07-18 RX ORDER — ONDANSETRON 8 MG/1
4 TABLET, FILM COATED ORAL ONCE
Refills: 0 | Status: COMPLETED | OUTPATIENT
Start: 2023-07-18 | End: 2023-07-18

## 2023-07-18 RX ORDER — ACETAMINOPHEN 500 MG
650 TABLET ORAL ONCE
Refills: 0 | Status: COMPLETED | OUTPATIENT
Start: 2023-07-18 | End: 2023-07-18

## 2023-07-18 RX ORDER — ONDANSETRON 8 MG/1
1 TABLET, FILM COATED ORAL
Qty: 12 | Refills: 0
Start: 2023-07-18

## 2023-07-18 RX ADMIN — MORPHINE SULFATE 4 MILLIGRAM(S): 50 CAPSULE, EXTENDED RELEASE ORAL at 03:17

## 2023-07-18 RX ADMIN — ONDANSETRON 4 MILLIGRAM(S): 8 TABLET, FILM COATED ORAL at 03:17

## 2023-07-18 RX ADMIN — Medication 650 MILLIGRAM(S): at 05:43

## 2023-07-18 NOTE — ED ADULT TRIAGE NOTE - CHIEF COMPLAINT QUOTE
" I have headache since this past Friday , the pain is worse on my left  eye going head " Pt took Tylenol 1 tab at 7 am today

## 2023-07-18 NOTE — ED PROVIDER NOTE - NEUROLOGICAL STRAIGHT LEG RAISE
[facility] phone call message- general phone call:    Reason for call: Debra is requesting a letter be drafted and sent via fax to : [phone] concerning patients past mental health visits and how the outcome has been for her concerning her mental health condition and how she is able to handle life skills and manage her depression/anxiety.    Return call needed: No    OK to leave a message on voice mail? Yes    Primary language: English      needed? No    Call taken on June 1, 2018 at 12:23 PM by Avani Everett     normal bilaterally

## 2023-07-18 NOTE — ED PROVIDER NOTE - PROGRESS NOTE DETAILS
Date:  7/13/2017    Medication:  Cetrizine 10MG     [] Patient completely out of medication    Message to Prescriber:     Pharmacy Name:  [facility]    Pharmacy Phone Number:  [phone]    Pharmacy Fax Number:  [phone]    Today's repeat CT is slightly different from yesterday's according to radiology report cannot rule out CSF leak or venous thrombosis.  We attempted to call neurosurgery but they were unavailable and will call us back.  Patient refused to wait for neurosurgery consult or transfer for further evaluation.  Left ER without signing out AMA.

## 2023-07-18 NOTE — ED ADULT NURSE NOTE - CHIEF COMPLAINT QUOTE
pt presents to ED with c/o of HA since Friday, pt reports he has been taking Tylenol with no relief, rates pain 8/10, denies chest pain, SOB, N/V, blurry vision and back pain

## 2023-07-18 NOTE — ED ADULT NURSE NOTE - OBJECTIVE STATEMENT
Pt in ED c/o HA since Friday.  Pt breathing symmetrical and unlabored, cardiac monitoring in place  Pt in no acute distress at this time.  Pt denies chest pain, SOB, N/V, blurry vision and back pain

## 2023-07-18 NOTE — ED ADULT NURSE NOTE - OBJECTIVE STATEMENT
27 yo M denies PMHx presents to ED via walk in c/o HA. Pt was reportedly seen at  yesterday w/ similar complaints, referred for f/u w/ neurosurgeon for possible hydromas vs chronic SDH. Pt reports HA since Friday, took one Tylenol w/o improvement. Pt denies any CP, SOB, cough, N/V, fever, chills, urinary complaints, constipation, diarrhea, dizziness, weakness. Pt A&Ox4, lungs CTA, +central pulses. Abdomen soft, not tender, not distended. Ambulating w/ steady gait, safety and comfort maintained, no acute distress noted at this time. Pt denies any recent travel or known sick contacts. immune
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
## 2023-07-18 NOTE — ED ADULT NURSE NOTE - NSFALLUNIVINTERV_ED_ALL_ED
Bed/Stretcher in lowest position, wheels locked, appropriate side rails in place/Call bell, personal items and telephone in reach/Instruct patient to call for assistance before getting out of bed/chair/stretcher/Non-slip footwear applied when patient is off stretcher/[place] to call system/Physically safe environment - no spills, clutter or unnecessary equipment/Purposeful proactive rounding/Room/bathroom lighting operational, light cord in reach

## 2023-07-18 NOTE — ED PROVIDER NOTE - ENMT, MLM
Airway patent, Nasal mucosa clear. Mouth with normal mucosa. Throat has no vesicles, no oropharyngeal exudates and uvula is midline.  There is no scalp tenderness or deformity.  Neck is supple full range of motion intact nontender.
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
## 2024-01-16 NOTE — ED STATDOCS - NSCAREINITIATED _GEN_ER
Procedure To Be Performed At Next Visit: Cryotherapy X Size Of Lesion In Cm (Optional): 0 Detail Level: Detailed Introduction Text (Please End With A Colon): The following procedure was deferred:: Roseann Reynolds(Attending)

## 2024-04-18 NOTE — ED ADULT TRIAGE NOTE - CHIEF COMPLAINT QUOTE
Patients Guide to Back Pain    Low back pain effects 2/3 of adults at some point in their lives.  It is one of the top 10 reasons to go to the doctor.  Back pain is scary, sudden, and painful but does usually improve.  It is usually benign and is not caused by a serious underlying disease.  95% of back pain is mechanical, meaning something makes it worse (most commonly bending and sitting).    If bending makes it worse, extension stretches can be helpful.    If standing makes it worse flexion, stretches and z-lie can be helpful.   You can find details on these stretches and more in Treat Your Own Back by Cortez Torres.    Moving is the best medicine, even if it hurts. Bed rest is not recommended for back pain.  Early return to daily activities leads to less chronic disability.  Early involvement in Physical Therapy can decrease the likelihood of acute low back pain becoming chronic.1  Back pain is a complex issue to diagnose. A definite diagnosis for back pain cannot be made for nearly 85% of patients at the initial visit.   Medicine such as muscle relaxers and anti-inflammatories can be helpful. Narcotic pain medicine is not recommended for back pain.    Imaging Guidance:  Imaging such as MRIs and X-rays are not necessary in the beginning and do not influence treatment or influence the course of acute back pain. Degenerative changes such as disc bulges and arthritis are common in people as young as 18 and with no pain.  In people under 50 with no signs and symptoms of systemic disease no imaging is needed.2  Conservative care for 6 weeks is recommended for patients with or without shooting nerve pain in arms/legs prior to MRI.2  Imaging of patients with low back pain without indications of serious underlying conditions does not improve outcomes.3  RESOURCES  [facility] website:  [email or web address]  [facility]  website:  [email or web address]  Books:  Treat Your Own Back by Cortez Torres  Treat Your Own Neck by Cortez Torres  KEY TAKEAWAYS:  Moving is recommended. Bed rest is not recommended.  Seek Physical Therapy as early as possible.  Acute flares will improve.    1WVan bob et al. Nonpharmacologic options for treating acute and chronic pain. PMR journal 7 2015) [id number]  2JHardeep leal and Micheal Gonsales. Diagnostic evaluation of low back pain with emphasis on imaging. Annals of internal medicine 2002; 137:586-597  3CCasey jackson Rongwei Fu, Micheal Pearce. Imaging studies for low back pain: systemic review and meta-analysis. Lancet 2009;373 463-91   pt presents to ED with c/o of HA since Friday, pt reports he has been taking Tylenol with no relief, rates pain 8/10, denies chest pain, SOB, N/V, blurry vision and back pain

## 2025-03-13 NOTE — ED PROVIDER NOTE - AGGRAVATING FACTORS
Viral Upper Respiratory Infection  - At this time no antibiotics are needed  - For cough, may use honey as a suppressant and humidified air  - Acetaminophen or Ibuprofen may be used for pain relief and fever reduction  - Viral infections can last up to 14 days  - If symptoms last longer than 2 weeks, or if symptoms worsen return to clinic or follow up with primary care  - If difficulty breathing or chest pain develop, go to the Emergency Dept.    
none